# Patient Record
Sex: FEMALE | Race: BLACK OR AFRICAN AMERICAN | NOT HISPANIC OR LATINO | URBAN - METROPOLITAN AREA
[De-identification: names, ages, dates, MRNs, and addresses within clinical notes are randomized per-mention and may not be internally consistent; named-entity substitution may affect disease eponyms.]

---

## 2023-10-23 NOTE — PROGRESS NOTES
"[facility] [place]       Primary Care Physician: Michael Rangel MD  Date of Visit: 10/26/2023  1:40 PM EDT     HPI / Summary:   Gerry Estrada is a 37 y.o. female with severe eosinophilic asthma, SLE, prior hysterectomy and moderate mitral regurgitation and mild-moderate aortic regurgitation who presents for a second opinion regarding her valve disease.    She has followed for many years at [facility] with Dr. Christensen for her mitral regurgitation (notes indicate restricted posterior leaflet) and aortic regurgitation. She saw Dr Souza from [facility] cardiac surgery in 5/2022 to discuss possible MV surgery. According to Dr. Christensen's 2018 note, he cites a 2012 echo which reports moderate MR and AR.     She had an ED visit in 9/2023 for dyspnea. She is on spironolactone, furosemide, imdur and carvedilol. Her regurgitation is reportedly better with improved blood pressure control.     She reports difficulty breathing at times. She thinks there is a cycle of respiratory illnesses which can lead to hypertension and worsening regurgitation. She sleeps propped up due to breathing. She and her mother both think she had \"rheumatic disease and scarlet fever\" as a child. Her goal weight is 165, she is currently at 171 lbs.     ROS: Relevant review of symptoms of negative unless discussed above.     Problems:   Patient Active Problem List   Diagnosis    ACL tear    Obesity (BMI 30-39.9)    Wrist fracture, right    Tear of MCL (medial collateral ligament) of knee    Systolic murmur    Systemic lupus erythematosus, unspecified (CMS/HCC)    Secondary pulmonary arterial hypertension (CMS/HCC)    Shortness of breath    Tricuspid regurgitation    Moderate mitral regurgitation    Aortic regurgitation    Hyperlipidemia    GERD (gastroesophageal reflux disease)    Hypertension       Medical History:   No past medical history on file.    Surgical Hx:   No past surgical history on file.     Family Hx: " "  No family history on file.     Social Hx:  Fun: shop at [employer]  Occupation/School: Has been off of work for 2 years, prior cook at [employer]  EtOH/Smoking/Drugs: none    Medications  Current Outpatient Medications   Medication Instructions    albuterol 90 mcg/actuation inhaler 2 puffs, inhalation, Every 4 hours PRN    Align 4 mg, oral, Daily RT    carvedilol (Coreg) 12.5 mg tablet 1 tablet, oral, 2 times daily with meals    fluticasone (Flonase) 50 mcg/actuation nasal spray 1 spray, Does not apply, 2 times daily    furosemide (Lasix) 40 mg tablet Take 40 mg daily for weight greater than 165lb    hydroxychloroquine (PLAQUENIL) 200 mg, oral, 2 times daily    ipratropium-albuteroL (Duo-Neb) 0.5-2.5 mg/3 mL nebulizer solution 3 mL, inhalation, Every 6 hours PRN    isosorbide mononitrate ER (IMDUR) 30 mg, oral, Daily RT    mycophenolate (MYFORTIC) 360 mg, oral, Daily RT    pantoprazole (PROTONIX) 40 mg, oral, 2 times daily    potassium chloride CR 10 mEq ER tablet 10 mEq, oral, Every 48 hours    predniSONE (DELTASONE) 2.5 mg, oral, Daily    spironolactone (ALDACTONE) 25 mg, oral, Daily RT    sucralfate (CARAFATE) 1 g, oral, 4 times daily       Allergies  Lisinopril, Naproxen, Albuterol, Hydroxyzine hcl, Hydroxyzine pamoate, Methylprednisolone, Sulfa (sulfonamide antibiotics), and Methotrexate    Exam:   Vitals: /77 (BP Location: Left arm, Patient Position: Sitting)   Pulse 60   Ht 1.638 m (5' 4.5\")   Wt 77.6 kg (171 lb)   SpO2 99%   BMI 28.90 kg/m²   Wt Readings from Last 5 Encounters:   10/26/23 77.6 kg (171 lb)     GEN: Pleasant, well-appearing, mildly tearful  HEENT: JVP not well seen at 30 degrees. No clear hepatojugular reflex.   CHEST: Clear to auscultation, No wheeze, good air movement.  CV: Regular rate, normal rhythm, faint systolic murmur heard at the apex without radiation  ABD: Soft  EXT: Warm, well perfused, No LE edema.   NEURO: grossly non focal  SKIN: No obvious rashes     Labs: " "  Lipids  No results found for: \"CHOL\"  No results found for: \"HDL\"  No results found for: \"LDLCALC\"  No results found for: \"TRIG\"  No components found for: \"CHOLHDL\"    Hemoglobin A1C  Lab Results   Component Value Date    HGBA1C 4.8 09/21/2021       BMP  Lab Results   Component Value Date    GLUCOSE 75 11/05/2021    CALCIUM 9.0 11/05/2021     11/05/2021    K 4.0 11/05/2021    CO2 21 11/05/2021     (H) 11/05/2021    BUN 16 11/05/2021    CREATININE 0.73 11/05/2021         Notable Studies: imaging personally reviewed and summarized by me below  EKG:  -10/26/23: sinus bradycardia with sinus arrhythmia, HR 55, normal axis, normal ST segments    Echo:  9/28/23 ([facility] Echo); EF 62%, RV size and function normal, LA severely dilated (CORA 93), moderate AR and MR. IVS 1.0 cm, LVPW 1.1 cm, RVSP 28 + RA, RA normal, mild thickening of the MV, trace TR, mild thickening of the AV, restricted posterior leaflet. Reportedly improves with BP control.     Cardiac MRI:  12/1/21([facility]):  1. Mildly dilated left ventricular size (LVEDVi 103 mL/m2) and normal systolic function (LVEF 60%). No LGE.  2. Mildly dilated right ventricular size (RVEDVi 102 mL/m2) and normal systolic function (RVEF 53%).  3. The mitral valve appears mildly thickened with restricted opening of the posterior leaflet and diastolic doming of the anterior leaflet. There is moderate mitral regurgitation with a central jet of insufficiency (RF 21%).  4.  Trileaflet aortic valve with mild visualized aortic regurgitation with a central jet of insufficiency (RF 4%).     Assessment and Plan  Gerry Estrada is a 37 y.o. female with severe eosinophilic asthma, SLE, prior hysterectomy and moderate mitral regurgitation and mild-moderate aortic regurgitation who presents for a second opinion regarding her valve disease.    She has followed at [facility] for many years for her moderate regurgitation. By report, it does not seem as though the severity has changed over time. " This is with the limitation that I do not have access to the primary images. By physical exam, she does not have murmurs consistent with severe AR or severe MR. There seem to be some challenges determining whether her dyspneic episodes are initially set off by respiratory infections or pulmonary edema. Her blood pressure is well controlled on carvedilol 12.5 mg BID, lasix 40 mg daily, spironolactone 25 mg and Imdur 30 mg. She also describes anxiety as a contributor to her dyspnea.     Overall we discussed that moderate MR and AR should not typically cause these decompensations unless they become more severe at times of hypertension or the regurgitation is being underestimated. We discussed that tight BP control is imperative and that doing a EDGAR or repeat CMR might help. She will follow up with her [facility] physicians to discuss these tests. We are available for repeat consultation in the future if needed. If so, would request the images from TTEs and TEEs are sent for review.     Eagle Wade MD  Director, Sports Cardiology  [facility]    Part of this note was completed using dictation and voice recognition software. Please excuse minor errors and typos.

## 2023-10-25 PROBLEM — I34.0 MODERATE MITRAL REGURGITATION: Status: ACTIVE | Noted: 2017-08-14

## 2023-10-25 PROBLEM — I35.1 AORTIC REGURGITATION: Status: ACTIVE | Noted: 2017-08-14

## 2023-10-25 PROBLEM — M32.9 SYSTEMIC LUPUS ERYTHEMATOSUS, UNSPECIFIED (MULTI): Status: ACTIVE | Noted: 2021-11-06

## 2023-10-25 PROBLEM — I07.1 TRICUSPID REGURGITATION: Status: ACTIVE | Noted: 2019-05-28

## 2023-10-25 PROBLEM — K21.9 GERD (GASTROESOPHAGEAL REFLUX DISEASE): Status: ACTIVE | Noted: 2017-08-15

## 2023-10-25 PROBLEM — I10 HYPERTENSION: Status: ACTIVE | Noted: 2023-10-25

## 2023-10-25 PROBLEM — I27.21 SECONDARY PULMONARY ARTERIAL HYPERTENSION (MULTI): Status: ACTIVE | Noted: 2019-05-28

## 2023-10-25 PROBLEM — R06.02 SHORTNESS OF BREATH: Status: ACTIVE | Noted: 2021-11-06

## 2023-10-25 RX ORDER — MYCOPHENOLIC ACID 360 MG/1
360 TABLET, DELAYED RELEASE ORAL
COMMUNITY
Start: 2023-09-27

## 2023-10-25 RX ORDER — ALUMINUM ZIRCONIUM OCTACHLOROHYDREX GLY 16 G/100G
4 GEL TOPICAL
COMMUNITY
Start: 2022-03-02

## 2023-10-25 RX ORDER — POTASSIUM CHLORIDE 750 MG/1
10 TABLET, FILM COATED, EXTENDED RELEASE ORAL
COMMUNITY
Start: 2023-10-12

## 2023-10-25 RX ORDER — HYDROXYCHLOROQUINE SULFATE 200 MG/1
200 TABLET, FILM COATED ORAL 2 TIMES DAILY
COMMUNITY
Start: 2023-09-27

## 2023-10-25 RX ORDER — CARVEDILOL 12.5 MG/1
1 TABLET ORAL
COMMUNITY
Start: 2023-07-28

## 2023-10-25 RX ORDER — IPRATROPIUM BROMIDE AND ALBUTEROL SULFATE 2.5; .5 MG/3ML; MG/3ML
3 SOLUTION RESPIRATORY (INHALATION) EVERY 6 HOURS PRN
COMMUNITY
Start: 2023-06-22

## 2023-10-25 RX ORDER — ISOSORBIDE MONONITRATE 30 MG/1
30 TABLET, EXTENDED RELEASE ORAL
COMMUNITY
Start: 2023-02-08

## 2023-10-25 RX ORDER — HYDRALAZINE HYDROCHLORIDE 10 MG/1
10 TABLET, FILM COATED ORAL EVERY 6 HOURS PRN
COMMUNITY
Start: 2023-08-01 | End: 2023-10-26 | Stop reason: ALTCHOICE

## 2023-10-25 RX ORDER — SUCRALFATE 1 G/1
1 TABLET ORAL 4 TIMES DAILY
COMMUNITY
Start: 2023-07-13

## 2023-10-25 RX ORDER — SPIRONOLACTONE 25 MG/1
25 TABLET ORAL
COMMUNITY
Start: 2023-07-28

## 2023-10-25 RX ORDER — FUROSEMIDE 40 MG/1
TABLET ORAL
COMMUNITY
Start: 2023-09-19

## 2023-10-25 RX ORDER — FLUTICASONE PROPIONATE 50 MCG
1 SPRAY, SUSPENSION (ML) NASAL 2 TIMES DAILY
COMMUNITY
Start: 2021-12-09

## 2023-10-25 RX ORDER — ALBUTEROL SULFATE 90 UG/1
2 AEROSOL, METERED RESPIRATORY (INHALATION) EVERY 4 HOURS PRN
COMMUNITY
Start: 2023-03-27

## 2023-10-25 RX ORDER — PANTOPRAZOLE SODIUM 40 MG/1
40 TABLET, DELAYED RELEASE ORAL 2 TIMES DAILY
COMMUNITY
Start: 2023-05-19

## 2023-10-26 ENCOUNTER — OFFICE VISIT (OUTPATIENT)
Dept: CARDIOLOGY | Facility: CLINIC | Age: 38
End: 2023-10-26
Payer: COMMERCIAL

## 2023-10-26 VITALS
HEART RATE: 60 BPM | DIASTOLIC BLOOD PRESSURE: 77 MMHG | OXYGEN SATURATION: 99 % | HEIGHT: 65 IN | BODY MASS INDEX: 28.49 KG/M2 | WEIGHT: 171 LBS | SYSTOLIC BLOOD PRESSURE: 129 MMHG

## 2023-10-26 DIAGNOSIS — I10 HYPERTENSION, UNSPECIFIED TYPE: Primary | ICD-10-CM

## 2023-10-26 DIAGNOSIS — I34.0 MODERATE MITRAL REGURGITATION: ICD-10-CM

## 2023-10-26 DIAGNOSIS — I35.1 AORTIC VALVE INSUFFICIENCY, ETIOLOGY OF CARDIAC VALVE DISEASE UNSPECIFIED: ICD-10-CM

## 2023-10-26 LAB
ATRIAL RATE: 55 BPM
P AXIS: 58 DEGREES
P OFFSET: 202 MS
P ONSET: 138 MS
PR INTERVAL: 156 MS
Q ONSET: 216 MS
QRS COUNT: 9 BEATS
QRS DURATION: 94 MS
QT INTERVAL: 414 MS
QTC CALCULATION(BAZETT): 396 MS
QTC FREDERICIA: 402 MS
R AXIS: 50 DEGREES
T AXIS: 53 DEGREES
T OFFSET: 423 MS
VENTRICULAR RATE: 55 BPM

## 2023-10-26 PROCEDURE — 3074F SYST BP LT 130 MM HG: CPT | Performed by: INTERNAL MEDICINE

## 2023-10-26 PROCEDURE — 93010 ELECTROCARDIOGRAM REPORT: CPT | Performed by: INTERNAL MEDICINE

## 2023-10-26 PROCEDURE — 1036F TOBACCO NON-USER: CPT | Performed by: INTERNAL MEDICINE

## 2023-10-26 PROCEDURE — 93005 ELECTROCARDIOGRAM TRACING: CPT | Performed by: INTERNAL MEDICINE

## 2023-10-26 PROCEDURE — 3078F DIAST BP <80 MM HG: CPT | Performed by: INTERNAL MEDICINE

## 2023-10-26 PROCEDURE — 99214 OFFICE O/P EST MOD 30 MIN: CPT | Performed by: INTERNAL MEDICINE

## 2023-10-26 PROCEDURE — 99204 OFFICE O/P NEW MOD 45 MIN: CPT | Performed by: INTERNAL MEDICINE

## 2023-10-26 RX ORDER — PREDNISONE 2.5 MG/1
2.5 TABLET ORAL DAILY
COMMUNITY

## 2023-10-26 ASSESSMENT — PATIENT HEALTH QUESTIONNAIRE - PHQ9
SUM OF ALL RESPONSES TO PHQ9 QUESTIONS 1 AND 2: 0
1. LITTLE INTEREST OR PLEASURE IN DOING THINGS: NOT AT ALL
2. FEELING DOWN, DEPRESSED OR HOPELESS: NOT AT ALL

## 2023-10-26 ASSESSMENT — COLUMBIA-SUICIDE SEVERITY RATING SCALE - C-SSRS
6. HAVE YOU EVER DONE ANYTHING, STARTED TO DO ANYTHING, OR PREPARED TO DO ANYTHING TO END YOUR LIFE?: NO
2. HAVE YOU ACTUALLY HAD ANY THOUGHTS OF KILLING YOURSELF?: NO
1. IN THE PAST MONTH, HAVE YOU WISHED YOU WERE DEAD OR WISHED YOU COULD GO TO SLEEP AND NOT WAKE UP?: NO
2. HAVE YOU ACTUALLY HAD ANY THOUGHTS OF KILLING YOURSELF?: NO
6. HAVE YOU EVER DONE ANYTHING, STARTED TO DO ANYTHING, OR PREPARED TO DO ANYTHING TO END YOUR LIFE?: NO
1. IN THE PAST MONTH, HAVE YOU WISHED YOU WERE DEAD OR WISHED YOU COULD GO TO SLEEP AND NOT WAKE UP?: NO

## 2023-10-26 ASSESSMENT — ENCOUNTER SYMPTOMS
OCCASIONAL FEELINGS OF UNSTEADINESS: 0
DEPRESSION: 0
LOSS OF SENSATION IN FEET: 0

## 2023-10-26 ASSESSMENT — PAIN SCALES - GENERAL: PAINLEVEL: 0-NO PAIN
